# Patient Record
Sex: MALE | Race: BLACK OR AFRICAN AMERICAN | NOT HISPANIC OR LATINO | Employment: STUDENT | URBAN - METROPOLITAN AREA
[De-identification: names, ages, dates, MRNs, and addresses within clinical notes are randomized per-mention and may not be internally consistent; named-entity substitution may affect disease eponyms.]

---

## 2019-08-12 ENCOUNTER — OFFICE VISIT (OUTPATIENT)
Dept: INTERNAL MEDICINE | Facility: CLINIC | Age: 19
End: 2019-08-12
Payer: COMMERCIAL

## 2019-08-12 ENCOUNTER — LAB VISIT (OUTPATIENT)
Dept: LAB | Facility: HOSPITAL | Age: 19
End: 2019-08-12
Attending: PHYSICIAN ASSISTANT
Payer: COMMERCIAL

## 2019-08-12 VITALS
TEMPERATURE: 96 F | OXYGEN SATURATION: 99 % | WEIGHT: 130.94 LBS | BODY MASS INDEX: 18.74 KG/M2 | DIASTOLIC BLOOD PRESSURE: 64 MMHG | HEIGHT: 70 IN | SYSTOLIC BLOOD PRESSURE: 116 MMHG | HEART RATE: 72 BPM

## 2019-08-12 DIAGNOSIS — A64 STD (MALE): Primary | ICD-10-CM

## 2019-08-12 DIAGNOSIS — A64 STD (MALE): ICD-10-CM

## 2019-08-12 PROCEDURE — 99999 PR PBB SHADOW E&M-NEW PATIENT-LVL III: ICD-10-PCS | Mod: PBBFAC,,, | Performed by: PHYSICIAN ASSISTANT

## 2019-08-12 PROCEDURE — 96372 PR INJECTION,THERAP/PROPH/DIAG2ST, IM OR SUBCUT: ICD-10-PCS | Mod: S$GLB,,, | Performed by: PHYSICIAN ASSISTANT

## 2019-08-12 PROCEDURE — 36415 COLL VENOUS BLD VENIPUNCTURE: CPT

## 2019-08-12 PROCEDURE — 3008F BODY MASS INDEX DOCD: CPT | Mod: CPTII,S$GLB,, | Performed by: PHYSICIAN ASSISTANT

## 2019-08-12 PROCEDURE — 99214 OFFICE O/P EST MOD 30 MIN: CPT | Mod: 25,S$GLB,, | Performed by: PHYSICIAN ASSISTANT

## 2019-08-12 PROCEDURE — 80074 ACUTE HEPATITIS PANEL: CPT

## 2019-08-12 PROCEDURE — 99214 PR OFFICE/OUTPT VISIT, EST, LEVL IV, 30-39 MIN: ICD-10-PCS | Mod: 25,S$GLB,, | Performed by: PHYSICIAN ASSISTANT

## 2019-08-12 PROCEDURE — 99999 PR PBB SHADOW E&M-NEW PATIENT-LVL III: CPT | Mod: PBBFAC,,, | Performed by: PHYSICIAN ASSISTANT

## 2019-08-12 PROCEDURE — 87491 CHLMYD TRACH DNA AMP PROBE: CPT

## 2019-08-12 PROCEDURE — 86703 HIV-1/HIV-2 1 RESULT ANTBDY: CPT

## 2019-08-12 PROCEDURE — 96372 THER/PROPH/DIAG INJ SC/IM: CPT | Mod: S$GLB,,, | Performed by: PHYSICIAN ASSISTANT

## 2019-08-12 PROCEDURE — 86592 SYPHILIS TEST NON-TREP QUAL: CPT

## 2019-08-12 PROCEDURE — 3008F PR BODY MASS INDEX (BMI) DOCUMENTED: ICD-10-PCS | Mod: CPTII,S$GLB,, | Performed by: PHYSICIAN ASSISTANT

## 2019-08-12 RX ORDER — CEFTRIAXONE 500 MG/1
500 INJECTION, POWDER, FOR SOLUTION INTRAMUSCULAR; INTRAVENOUS ONCE
Status: COMPLETED | OUTPATIENT
Start: 2019-08-12 | End: 2019-08-12

## 2019-08-12 RX ORDER — CEFTRIAXONE 500 MG/1
500 INJECTION, POWDER, FOR SOLUTION INTRAMUSCULAR; INTRAVENOUS ONCE
Status: DISCONTINUED | OUTPATIENT
Start: 2019-08-12 | End: 2019-08-12

## 2019-08-12 RX ORDER — DOXYCYCLINE 100 MG/1
100 CAPSULE ORAL EVERY 12 HOURS
Qty: 20 CAPSULE | Refills: 0 | Status: SHIPPED | OUTPATIENT
Start: 2019-08-12

## 2019-08-12 RX ADMIN — CEFTRIAXONE 500 MG: 500 INJECTION, POWDER, FOR SOLUTION INTRAMUSCULAR; INTRAVENOUS at 03:08

## 2019-08-12 NOTE — PATIENT INSTRUCTIONS
Teens: STD Symptoms in Men  Male sex organs are mostly outside the body (the genitals). This makes signs of certain STDs (sexually transmitted diseases) easier to spot. But STDs can also spread inside the body and damage the organs that allow you to father a child. This damage can sometimes cause sterility--meaning you wont be able to have kids. Also, men may have fewer symptoms of STDs than women. So pay attention to your body. Learn whats normal for you, and have any symptoms checked out.  What are the symptoms of STDs?  In men, symptoms of an STD are often outside the body. But problems can happen inside, too. Common symptoms may include:  · Discharge (fluid) or a drip from the penis or rectum, which can be yellow, white, green, or clear  · Burning or pain during urination  · Sores or blisters on or around the mouth, genitals, or rectum  · Lumps or bumps on the genitals  · Itching on or around the genitals  · Pain in the genitals or rectum  Keep in mind: You may not have any symptoms. So get checked if youre at risk of STDs.  Male genitals    Date Last Reviewed: 1/1/2017  © 1875-1865 [employer]. [address] [place] [address], [place] [address]. All rights reserved. This information is not intended as a substitute for professional medical care. Always follow your healthcare professional's instructions.

## 2019-08-13 LAB
C TRACH DNA SPEC QL NAA+PROBE: DETECTED
HAV IGM SERPL QL IA: NEGATIVE
HBV CORE IGM SERPL QL IA: NEGATIVE
HBV SURFACE AG SERPL QL IA: NEGATIVE
HCV AB SERPL QL IA: NEGATIVE
HIV 1+2 AB+HIV1 P24 AG SERPL QL IA: NEGATIVE
N GONORRHOEA DNA SPEC QL NAA+PROBE: NOT DETECTED
RPR SER QL: NORMAL

## 2019-08-13 NOTE — PROGRESS NOTES
Subjective:       Patient ID: Valerio Ward is a 18 y.o. male.    Chief Complaint: Groin Pain    Exposure to STD   The patient's pertinent negatives include no genital lesions, penile discharge, penile pain or testicular pain. This is a new problem. The current episode started today. The problem occurs constantly. The patient is experiencing no pain. Pertinent negatives include no abdominal pain, chest pain, chills or shortness of breath. Nothing aggravates the symptoms. He has tried nothing for the symptoms. He is sexually active with multiple partners. He inconsistently uses condoms. Yes, his partner has an STD.   No past medical history on file.    Review of Systems   Constitutional: Negative for chills and fatigue.   Respiratory: Negative for chest tightness and shortness of breath.    Cardiovascular: Negative for chest pain.   Gastrointestinal: Negative for abdominal pain.   Genitourinary: Negative for discharge, penile pain and testicular pain.       Objective:      Physical Exam   Constitutional: He appears well-developed and well-nourished. No distress.   Cardiovascular: Normal rate and regular rhythm. Exam reveals no gallop and no friction rub.   No murmur heard.  Pulmonary/Chest: Effort normal and breath sounds normal. No stridor. No respiratory distress. He has no wheezes. He has no rales. He exhibits no tenderness.   Abdominal: Soft. Bowel sounds are normal. He exhibits no distension and no mass. There is no tenderness. There is no rebound and no guarding. No hernia.   Skin: He is not diaphoretic.   Nursing note and vitals reviewed.      Assessment:       1. STD (male)        Plan:       STD (male)  -     C. trachomatis/N. gonorrhoeae by AMP DNA  -     Hepatitis panel, acute; Future; Expected date: 08/12/2019  -     HIV 1/2 Ag/Ab (4th Gen); Future; Expected date: 08/12/2019  -     RPR; Future; Expected date: 08/12/2019    Other orders  -     Discontinue: cefTRIAXone injection 500 mg  -     doxycycline  (VIBRAMYCIN) 100 MG Cap; Take 1 capsule (100 mg total) by mouth every 12 (twelve) hours.  Dispense: 20 capsule; Refill: 0  -     cefTRIAXone injection 500 mg